# Patient Record
Sex: FEMALE | Race: WHITE | NOT HISPANIC OR LATINO | URBAN - METROPOLITAN AREA
[De-identification: names, ages, dates, MRNs, and addresses within clinical notes are randomized per-mention and may not be internally consistent; named-entity substitution may affect disease eponyms.]

---

## 2018-02-04 ENCOUNTER — EMERGENCY (EMERGENCY)
Facility: HOSPITAL | Age: 23
LOS: 1 days | Discharge: HOME | End: 2018-02-04
Attending: EMERGENCY MEDICINE | Admitting: EMERGENCY MEDICINE
Payer: COMMERCIAL

## 2018-02-04 VITALS
RESPIRATION RATE: 20 BRPM | HEART RATE: 116 BPM | HEIGHT: 59 IN | TEMPERATURE: 98 F | DIASTOLIC BLOOD PRESSURE: 79 MMHG | OXYGEN SATURATION: 96 % | SYSTOLIC BLOOD PRESSURE: 131 MMHG | WEIGHT: 110.01 LBS
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
## 2018-02-04 DIAGNOSIS — Z91.018 ALLERGY TO OTHER FOODS: ICD-10-CM

## 2018-02-04 DIAGNOSIS — Z91.010 ALLERGY TO PEANUTS: ICD-10-CM

## 2018-02-04 DIAGNOSIS — T78.1XXA OTHER ADVERSE FOOD REACTIONS, NOT ELSEWHERE CLASSIFIED, INITIAL ENCOUNTER: ICD-10-CM
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
## 2018-02-04 DIAGNOSIS — T78.40XA ALLERGY, UNSPECIFIED, INITIAL ENCOUNTER: ICD-10-CM

## 2018-02-04 PROCEDURE — 99284 EMERGENCY DEPT VISIT MOD MDM: CPT

## 2018-02-04 RX ORDER — EPINEPHRINE 0.3 MG/.3ML
0.3 INJECTION INTRAMUSCULAR; SUBCUTANEOUS
Qty: 1 | Refills: 0 | OUTPATIENT
Start: 2018-02-04

## 2018-02-04 RX ORDER — DIPHENHYDRAMINE HCL 50 MG
25 CAPSULE ORAL ONCE
Qty: 0 | Refills: 0 | Status: COMPLETED | OUTPATIENT
Start: 2018-02-04 | End: 2018-02-04

## 2018-02-04 RX ORDER — FAMOTIDINE 10 MG/ML
20 INJECTION INTRAVENOUS ONCE
Qty: 0 | Refills: 0 | Status: COMPLETED | OUTPATIENT
Start: 2018-02-04 | End: 2018-02-04

## 2018-02-04 RX ADMIN — Medication 125 MILLIGRAM(S): at 20:02

## 2018-02-04 RX ADMIN — FAMOTIDINE 20 MILLIGRAM(S): 10 INJECTION INTRAVENOUS at 20:02

## 2018-02-04 RX ADMIN — Medication 25 MILLIGRAM(S): at 20:02

## 2018-02-04 NOTE — ED PROVIDER NOTE - NS ED ROS FT
Denies wheezing, difficulty breathing, oral swelling, rash, fevers, chills, nausea, vomiting, diarrhea, constipation, abdominal pain, urinary symptoms, chest pain, palpitations, shortness of breath, dyspnea on exertion, syncope/near syncope, cough/URI symptoms, headache, weakness, numbness, focal deficits, visual changes, gait or balance changes, dizziness.

## 2018-02-04 NOTE — ED ADULT NURSE REASSESSMENT NOTE - NS ED NURSE REASSESS COMMENT FT1
pt complains of chest tightness. lungs clear b/l. MIHAELA artis notified. pt gvien 1 neb of albuterol. will reassess.

## 2018-02-04 NOTE — ED ADULT NURSE NOTE - OBJECTIVE STATEMENT
Pt was eating cookie dough and began having throat itching. Pt denies any swelling, rash or difficulty breathing. Pt appears in no apparent distress, will continue to monitor

## 2018-02-04 NOTE — ED PROVIDER NOTE - MEDICAL DECISION MAKING DETAILS
Possible allergic reaction. Will give IV benadryl 25, pepcid 20, solumedrol 125 and observe pt. Normal exam. Possible allergic reaction. NOraml exam. Will give IV benadryl 25, pepcid 20, solumedrol 125 and observe pt.

## 2018-02-04 NOTE — ED PROVIDER NOTE - PROGRESS NOTE DETAILS
on re-eval pt still with normal exam. States she feels "completely normal". Has epi pens at home. Given strict return precautions.

## 2018-02-04 NOTE — ED PROVIDER NOTE - OBJECTIVE STATEMENT
22 y/o F with no pmhx, multiple allergies including nuts and buckwheat, pt states that she was eating raw cookie dough about 1 hour prior to arrival to ER and 30 minutes after started noticing some tickling sensation to back of throat and itching to skin. Pt came to ER concerned that cookie dough had nuts. Upon arrival to ER pt speaking in full sentences and has no visible signs of allergic reaction.

## 2018-02-04 NOTE — ED ADULT NURSE NOTE - ADDITIONAL PRINTED INSTRUCTIONS GIVEN
D/c w/ instructions for followup patient to f/u w/ pmd, return to ER for worsening symptoms, verbalized understanding of all instructions.

## 2018-02-04 NOTE — ED ADULT TRIAGE NOTE - CHIEF COMPLAINT QUOTE
c/o allergic reaction chest tightness, dyspnea, and throat closing. states that she was eating cookie and suddenly felt like her throat was closing.

## 2022-07-28 NOTE — ED PROVIDER NOTE - PHYSICAL EXAMINATION
VITAL SIGNS: I have reviewed nursing notes and confirm.  CONSTITUTIONAL: Well-developed; well-nourished; in no acute distress.  SKIN: Skin is warm and dry, no acute rash.  HEAD: Normocephalic; atraumatic.  EYES: PERRL, EOM intact; conjunctiva and sclera clear.  ENT: Posterior oropharynx with no edema or erythema. Tonsils normal size. No tongue or lip swelling.   NECK: Supple; non tender.  CARD: S1, S2 normal; no murmurs, gallops, or rubs. Regular rate and rhythm.  RESP: No wheezes, rales or rhonchi. No stridor.  ABD: Normal bowel sounds; soft; non-distended; non-tender; no hepatosplenomegaly.  EXT: Normal ROM. No clubbing, cyanosis or edema.  NEURO: Alert, oriented. Grossly unremarkable.  PSYCH: Cooperative, appropriate. (4) no limitation